# Patient Record
Sex: MALE | Race: OTHER | HISPANIC OR LATINO | URBAN - METROPOLITAN AREA
[De-identification: names, ages, dates, MRNs, and addresses within clinical notes are randomized per-mention and may not be internally consistent; named-entity substitution may affect disease eponyms.]

---

## 2018-06-11 ENCOUNTER — EMERGENCY (EMERGENCY)
Facility: HOSPITAL | Age: 38
LOS: 1 days | Discharge: HOME | End: 2018-06-11
Attending: EMERGENCY MEDICINE | Admitting: EMERGENCY MEDICINE
Payer: MEDICAID

## 2018-06-11 VITALS
TEMPERATURE: 98 F | HEART RATE: 82 BPM | OXYGEN SATURATION: 100 % | RESPIRATION RATE: 16 BRPM | DIASTOLIC BLOOD PRESSURE: 89 MMHG | SYSTOLIC BLOOD PRESSURE: 145 MMHG

## 2018-06-11 PROCEDURE — 99283 EMERGENCY DEPT VISIT LOW MDM: CPT | Mod: 25

## 2018-06-11 NOTE — ED ADULT TRIAGE NOTE - CHIEF COMPLAINT QUOTE
pt c/o intermittent cramps to left side of belly button x2weeks- denies any pain, constipation, urinary symptoms, n/v/d- pt states "it just feels weird"- appears comfortable

## 2018-06-12 VITALS
DIASTOLIC BLOOD PRESSURE: 90 MMHG | HEART RATE: 73 BPM | OXYGEN SATURATION: 100 % | SYSTOLIC BLOOD PRESSURE: 140 MMHG | RESPIRATION RATE: 18 BRPM

## 2018-06-12 RX ORDER — KETOROLAC TROMETHAMINE 30 MG/ML
15 SYRINGE (ML) INJECTION ONCE
Qty: 0 | Refills: 0 | Status: DISCONTINUED | OUTPATIENT
Start: 2018-06-12 | End: 2018-06-12

## 2018-06-12 RX ADMIN — Medication 15 MILLIGRAM(S): at 02:08

## 2018-06-12 NOTE — ED PROVIDER NOTE - ATTENDING CONTRIBUTION TO CARE
pt with left sided jose eduardo-umbilical pains over the last two weeks.  Intermittent "fluttering" for few seconds at a time 4-5 times a day.  No NVD no fevers.    Gen:  Well appearning in NAD  Head:  NC/AT  Resp: No distress   Abd: soft NT ND  Ext: no deformities  Skin: warm and dry as visualized     Pt with atypical abd pain.  Exam is benign.  No indication for CT scan or labs.  Symptomatic treatment.

## 2018-06-12 NOTE — ED PROVIDER NOTE - PLAN OF CARE
Please follow up with a PMD in 1 week. Call [phone] to find a doctor affiliated with [facility] in your neighborhood & network.     Take 600mg Motrin (also called Advil or Ibuprofen) every 6 hours as needed for fever/pain/discomfort/swelling. You can take this with Tylenol 650 mg (also called acetaminophen) every 6 hours.     Don't use more than 3500mg of Tylenol in any 24-hour period. Make sure your other prescription/over-the-counter medications don't contain any Tylenol so you don't take too much.     If you have any stomach discomfort while taking Motrin, you can use TUMS or Pepcid or Zantac (these can all be bought without a prescription).

## 2018-06-12 NOTE — ED PROVIDER NOTE - PHYSICAL EXAMINATION
Avelina Rosales, DO:   Gen: Well appearing, NAD  Head: NCAT  HEENT: PERRL, MMM, normal conjunctiva, anicteric, neck supple  Lung: CTAB, no adventitious sounds  CV: RRR, no murmurs, rubs or gallops  Abd: soft, NTND, no rebound or guarding, no CVAT  MSK: No edema, no visible deformities  Neuro: Ambulatory with steady gait  Skin: Warm and dry, no evidence of rash  Psych: normal mood and affect

## 2018-06-12 NOTE — ED PROVIDER NOTE - MEDICAL DECISION MAKING DETAILS
Avelina Rosales, DO: 38M with benign physical exam & no palpable hernia with L sided spasmodic pain. Pt physically active, likely muscle strain. Pain control, education & discharge.

## 2018-06-12 NOTE — ED PROVIDER NOTE - CARE PLAN
Principal Discharge DX:	Abdominal wall strain, initial encounter Principal Discharge DX:	Abdominal wall strain, initial encounter  Assessment and plan of treatment:	Please follow up with a PMD in 1 week. Call [phone] to find a doctor affiliated with [facility] in your neighborhood & network.     Take 600mg Motrin (also called Advil or Ibuprofen) every 6 hours as needed for fever/pain/discomfort/swelling. You can take this with Tylenol 650 mg (also called acetaminophen) every 6 hours.     Don't use more than 3500mg of Tylenol in any 24-hour period. Make sure your other prescription/over-the-counter medications don't contain any Tylenol so you don't take too much.     If you have any stomach discomfort while taking Motrin, you can use TUMS or Pepcid or Zantac (these can all be bought without a prescription).

## 2018-06-12 NOTE — ED PROVIDER NOTE - OBJECTIVE STATEMENT
Avelina Lanaamena, DO: 38M with no PMH here for L sided abdominal pain lateral to the umbilicus x 2 weeks, intermittent with last pain 2 hours ago described as pinching present with standing, better with rest. Patient recently started crossfit. No analgesia pta. Denies fevers, nausea/vomiting, anorexia, diarrhea, testicular pain, urinary symptoms, hematuria.     PMH: none  PSH: none  Allergies: none  Social Hx: recreational tobacco use, social ETOH use, recreational percocet & adderall use.

## 2018-06-12 NOTE — ED ADULT NURSE NOTE - OBJECTIVE STATEMENT
Claudia RN: Patient received in room #18 c/o pain next to umbilicus. Patient A&OX3, ambulatory. Patient reports pain is intermittent and started "few weeks ago". Patient states he recently started working out excessively. Patient denies any fevers, N/V/D. Patient denies any bulging noted to area. Abdomen soft. Patient reports last BM "few hours ago" and was "normal". VS as noted. Awaiting MD evaluation. Will monitor. Claudia RN: Patient received in room #18 c/o pain next to umbilicus. Patient A&OX3, ambulatory. Patient reports pain is intermittent and started "few weeks ago". Patient states he recently started working out excessively. Patient denies any fevers, N/V/D. Patient denies any bulging noted to area. Abdomen soft. Patient reports last BM "few hours ago" and was "normal". VS as noted. Awaiting MD evaluation. Report given to covering RN. Will monitor.

## 2020-02-25 ENCOUNTER — EMERGENCY (EMERGENCY)
Facility: HOSPITAL | Age: 40
LOS: 1 days | Discharge: HOME | End: 2020-02-25
Attending: EMERGENCY MEDICINE
Payer: SELF-PAY

## 2020-02-25 VITALS
OXYGEN SATURATION: 100 % | WEIGHT: 153 LBS | TEMPERATURE: 98 F | SYSTOLIC BLOOD PRESSURE: 130 MMHG | HEART RATE: 98 BPM | DIASTOLIC BLOOD PRESSURE: 80 MMHG | RESPIRATION RATE: 12 BRPM | HEIGHT: 68 IN

## 2020-02-25 LAB
APPEARANCE UR: CLEAR — SIGNIFICANT CHANGE UP
BACTERIA # UR AUTO: NEGATIVE — SIGNIFICANT CHANGE UP
BILIRUB UR-MCNC: NEGATIVE — SIGNIFICANT CHANGE UP
COLOR SPEC: COLORLESS — SIGNIFICANT CHANGE UP
DIFF PNL FLD: NEGATIVE — SIGNIFICANT CHANGE UP
EPI CELLS # UR: 0 /HPF — SIGNIFICANT CHANGE UP
GLUCOSE UR QL: NEGATIVE — SIGNIFICANT CHANGE UP
HIV 1 & 2 AB SERPL IA.RAPID: SIGNIFICANT CHANGE UP
HYALINE CASTS # UR AUTO: 0 /LPF — SIGNIFICANT CHANGE UP (ref 0–2)
KETONES UR-MCNC: NEGATIVE — SIGNIFICANT CHANGE UP
LEUKOCYTE ESTERASE UR-ACNC: ABNORMAL
NITRITE UR-MCNC: NEGATIVE — SIGNIFICANT CHANGE UP
PH UR: 6.5 — SIGNIFICANT CHANGE UP (ref 5–8)
PROT UR-MCNC: NEGATIVE — SIGNIFICANT CHANGE UP
RBC CASTS # UR COMP ASSIST: 1 /HPF — SIGNIFICANT CHANGE UP (ref 0–4)
SP GR SPEC: 1.01 — LOW (ref 1.01–1.02)
T PALLIDUM AB TITR SER: NEGATIVE — SIGNIFICANT CHANGE UP
UROBILINOGEN FLD QL: NEGATIVE — SIGNIFICANT CHANGE UP
WBC UR QL: 3 /HPF — SIGNIFICANT CHANGE UP (ref 0–5)

## 2020-02-25 PROCEDURE — 87591 N.GONORRHOEAE DNA AMP PROB: CPT

## 2020-02-25 PROCEDURE — 86780 TREPONEMA PALLIDUM: CPT

## 2020-02-25 PROCEDURE — 99283 EMERGENCY DEPT VISIT LOW MDM: CPT

## 2020-02-25 PROCEDURE — 86703 HIV-1/HIV-2 1 RESULT ANTBDY: CPT

## 2020-02-25 PROCEDURE — 81001 URINALYSIS AUTO W/SCOPE: CPT

## 2020-02-25 PROCEDURE — 87086 URINE CULTURE/COLONY COUNT: CPT

## 2020-02-25 PROCEDURE — 99284 EMERGENCY DEPT VISIT MOD MDM: CPT

## 2020-02-25 PROCEDURE — 87491 CHLMYD TRACH DNA AMP PROBE: CPT

## 2020-02-25 NOTE — ED PROVIDER NOTE - OBJECTIVE STATEMENT
40yo male asthma p/w penile discharge x3 weeks. Went to urgent care 2 weeks ago, tested negative for HSV, chlamydia and gonorrhea. Treated prophylactically with dual antibiotic therapy 2 weeks ago. Symptoms have mostly improved but still present. Sexually active solely with long time partner, she was tested negative for STD's 1 month prior. Denies h/o STD's, blood with discharge, genital rash, pruritus, similar symptoms in the past, dysuria, pain with defecation, fever, N/V/D, abdominal pain.

## 2020-02-25 NOTE — ED PROVIDER NOTE - NSFOLLOWUPCLINICS_GEN_ALL_ED_FT
[facility]  Urology  [address] [place] [address]  Phone: [phone]  Fax:   Follow Up Time:

## 2020-02-25 NOTE — ED PROVIDER NOTE - CHIEF COMPLAINT
The patient is a 39y Male complaining of
Alert and oriented to person, place and time, memory intact, behavior appropriate to situation, PERRL.

## 2020-02-25 NOTE — ED ADULT NURSE NOTE - OBJECTIVE STATEMENT
40 yo presents to the ED from home. A&Ox4, ambulatory c/o penile discharge x3 weeks. Went to urgent care 2 weeks ago, tested negative for HSV, chlamydia and gonorrhea. Treated prophylactically with dual antibiotic therapy 2 weeks ago. Symptoms have mostly improved but still present. Sexually active solely with long time partner, she was tested negative for STD's 1 month prior. Denies h/o STD's, blood with discharge, genital rash, pruritus, similar symptoms in the past, dysuria, pain with defecation, fever, N/V/D, abdominal pain.

## 2020-02-25 NOTE — ED PROVIDER NOTE - CLINICAL SUMMARY MEDICAL DECISION MAKING FREE TEXT BOX
40yo male p/w penile discharge x3 weeks s/p negative STD testing and prophylactic treatment for gonorrhea/chlamydia. Normal exam. Will check HIV, syphilis, gonorrhea, chlamydia, UA and UCx and reassess. Low concern for prostatitis. Possible UTI. Likely dc home with urology f/u. 38yo male p/w penile discharge x3 weeks s/p negative STD testing and prophylactic treatment for gonorrhea/chlamydia. Normal exam. Will check HIV, syphilis, gonorrhea, chlamydia, UA and UCx and reassess. Low concern for prostatitis. Possible UTI. Likely dc home with urology f/u.    LINDA Fletcher MD: Pt is a 40 y/o male with PMH asthma who p/w penile discharge x3 weeks. Went to urgent care 2 weeks ago, tested negative for HSV, chlamydia and gonorrhea, however, was treated prophylactically at that time for gonorrhea and chlamydia (endorses that he rec'd an IM injection and swallowed 4 pills). Symptoms have mostly improved, however, continues to have clear/white colored discharge, usually seen in the mornings. Sexually active with only 1 female long time partner, she was tested negative for STD's 1 month prior. Denies h/o STD's, blood with discharge, genital rash, pruritus, similar symptoms in the past, dysuria, pain with defecation, fever, N/V/D, abdominal pain, back pain. Given that pt has already been tested x2 and treated x 1 for gonorrhea and chlamydia, low likelihood, however, will re-test given con't as he may still have rec'd it from his partner. Will r/o UTI with u/a and cx. Pt would like HIV and syphillis testing, however, no chanchroid/ulcer visible. Given prolonged penile discharge despite tx for gonorrhea/chlamydia, will have pt f/u with our urology clinic, his name is provided to our . Will provide return precautions

## 2020-02-25 NOTE — ED PROVIDER NOTE - PROGRESS NOTE DETAILS
Farhan PGY1: Reviewed and discussed results with patient. Discussed importance of follow up and return precautions. Patient agrees with plan.

## 2020-02-25 NOTE — ED PROVIDER NOTE - NS ED ROS FT
ROS:  GENERAL: No fever, no chills  CARDIAC: no chest pain  PULMONARY: no cough, no dyspnea  GI: no abdominal pain, no nausea, no vomiting, no diarrhea, no constipation  : Penile discharge, no dysuria, hematuria or frequency  SKIN: no rashes  MSK: No joint pain

## 2020-02-25 NOTE — ED PROVIDER NOTE - PHYSICAL EXAMINATION
Physical Exam:  Gen: NAD, AOx3, non-toxic appearing, able to ambulate without assistance  Lung: CTAB, no respiratory distress, no wheezes/rhonchi/rales B/L, speaking in full sentences  CV: RRR, no murmurs, rubs or gallops, distal pulses 2+ b/l  Abd: soft, NT, ND, no guarding, no rigidity, no rebound tenderness, no CVA tenderness   : chaperoned by Melinda POWELL student, no rash/lesions, no discharge noted, no testicular TTP, normal genitalia  MSK: no visible deformities, ROM normal in UE/LE, no back TTP  Skin: Warm, well perfused, no rash, no leg swelling  Psych: normal affect, calm

## 2020-02-25 NOTE — ED PROVIDER NOTE - PATIENT PORTAL LINK FT
You can access the FollowMyHealth Patient Portal offered by [facility] by registering at the following website: [email or web address]. By joining [facility]’s FollowMyHealth portal, you will also be able to view your health information using other applications (apps) compatible with our system.

## 2020-02-25 NOTE — ED PROVIDER NOTE - NSFOLLOWUPINSTRUCTIONS_ED_ALL_ED_FT
- Continue all regular medications  - For pain, take tylenol or ibuprofen as directed on the packaging  - You will be contacted by our follow up team for a urology appointment, if you do not get a call within 3 days call the urology clinic number above  - You were given copies of labs and/or imaging results if applicable, please take them to your follow up appointments  - Return to the ER for painful rash in the genital area, fever, pain with defecation or any worsening symptoms or concerns

## 2020-02-26 LAB
C TRACH RRNA SPEC QL NAA+PROBE: SIGNIFICANT CHANGE UP
CULTURE RESULTS: NO GROWTH — SIGNIFICANT CHANGE UP
N GONORRHOEA RRNA SPEC QL NAA+PROBE: SIGNIFICANT CHANGE UP
SPECIMEN SOURCE: SIGNIFICANT CHANGE UP
SPECIMEN SOURCE: SIGNIFICANT CHANGE UP

## 2022-01-01 NOTE — ED PROVIDER NOTE - NS ED MD DISPO DISCHARGE CCDA
Patient/Caregiver provided printed discharge information.
CBC:     Chem:     Liver Functions:     Type & Screen:

## 2022-06-23 PROBLEM — J45.909 UNSPECIFIED ASTHMA, UNCOMPLICATED: Chronic | Status: ACTIVE | Noted: 2020-02-25

## 2023-02-22 NOTE — ED ADULT NURSE NOTE - PAIN RATING/NUMBER SCALE (0-10): REST
1 SSKI Counseling:  I discussed with the patient the risks of SSKI including but not limited to thyroid abnormalities, metallic taste, GI upset, fever, headache, acne, arthralgias, paraesthesias, lymphadenopathy, easy bleeding, arrhythmias, and allergic reaction.

## 2024-08-13 NOTE — ED ADULT NURSE NOTE - IS THE PATIENT ABLE TO BE SCREENED?
[Maximal Pain Intensity: 0/10] : 0/10 [90: Able to carry normal activity; minor signs or symptoms of disease.] : 90: Able to carry normal activity; minor signs or symptoms of disease.  Yes